# Patient Record
Sex: FEMALE | NOT HISPANIC OR LATINO | Employment: FULL TIME | URBAN - METROPOLITAN AREA
[De-identification: names, ages, dates, MRNs, and addresses within clinical notes are randomized per-mention and may not be internally consistent; named-entity substitution may affect disease eponyms.]

---

## 2023-10-23 PROBLEM — E80.6 INDIRECT HYPERBILIRUBINEMIA: Status: ACTIVE | Noted: 2023-10-23

## 2023-10-23 PROBLEM — D58.2 ELEVATED HEMOGLOBIN (CMS-HCC): Status: ACTIVE | Noted: 2023-10-23

## 2023-10-23 PROBLEM — R92.30 DENSE BREAST TISSUE ON MAMMOGRAM: Status: ACTIVE | Noted: 2023-10-23

## 2023-10-23 PROBLEM — I10 ESSENTIAL (PRIMARY) HYPERTENSION: Status: ACTIVE | Noted: 2023-10-23

## 2023-10-23 PROBLEM — R03.0 ELEVATED BLOOD PRESSURE READING: Status: ACTIVE | Noted: 2023-10-23

## 2023-10-23 PROBLEM — E80.4 GILBERT SYNDROME: Status: ACTIVE | Noted: 2023-10-23

## 2023-10-23 PROBLEM — F41.1 ANXIETY, GENERALIZED: Status: ACTIVE | Noted: 2023-10-23

## 2023-10-23 RX ORDER — SERTRALINE HYDROCHLORIDE 50 MG/1
50 TABLET, FILM COATED ORAL DAILY
COMMUNITY
Start: 2023-03-29 | End: 2023-10-24 | Stop reason: ALTCHOICE

## 2023-10-23 RX ORDER — PROPRANOLOL HYDROCHLORIDE 20 MG/1
TABLET ORAL EVERY 12 HOURS
COMMUNITY
Start: 2020-02-11 | End: 2023-10-24 | Stop reason: SDUPTHER

## 2023-10-23 RX ORDER — PNV NO.95/FERROUS FUM/FOLIC AC 28MG-0.8MG
TABLET ORAL
COMMUNITY

## 2023-10-23 RX ORDER — SPIRONOLACTONE 100 MG/1
100 TABLET, FILM COATED ORAL 2 TIMES DAILY
COMMUNITY

## 2023-10-23 RX ORDER — HYDROXYZINE HYDROCHLORIDE 25 MG/1
1-2 TABLET, FILM COATED ORAL EVERY 8 HOURS
COMMUNITY
Start: 2020-02-11

## 2023-10-24 ENCOUNTER — OFFICE VISIT (OUTPATIENT)
Dept: PRIMARY CARE | Facility: CLINIC | Age: 58
End: 2023-10-24
Payer: COMMERCIAL

## 2023-10-24 VITALS
SYSTOLIC BLOOD PRESSURE: 120 MMHG | HEART RATE: 66 BPM | DIASTOLIC BLOOD PRESSURE: 82 MMHG | BODY MASS INDEX: 24.42 KG/M2 | OXYGEN SATURATION: 99 % | RESPIRATION RATE: 18 BRPM | HEIGHT: 70 IN | TEMPERATURE: 98.4 F | WEIGHT: 170.6 LBS

## 2023-10-24 DIAGNOSIS — Z12.4 CERVICAL CANCER SCREENING: ICD-10-CM

## 2023-10-24 DIAGNOSIS — F41.1 ANXIETY, GENERALIZED: ICD-10-CM

## 2023-10-24 DIAGNOSIS — Z11.51 SCREENING FOR HPV (HUMAN PAPILLOMAVIRUS): ICD-10-CM

## 2023-10-24 DIAGNOSIS — Z00.00 ROUTINE HEALTH MAINTENANCE: Primary | ICD-10-CM

## 2023-10-24 DIAGNOSIS — I10 ESSENTIAL (PRIMARY) HYPERTENSION: ICD-10-CM

## 2023-10-24 DIAGNOSIS — Z12.31 BREAST CANCER SCREENING BY MAMMOGRAM: ICD-10-CM

## 2023-10-24 DIAGNOSIS — Z23 ENCOUNTER FOR IMMUNIZATION: ICD-10-CM

## 2023-10-24 PROCEDURE — 90686 IIV4 VACC NO PRSV 0.5 ML IM: CPT | Performed by: FAMILY MEDICINE

## 2023-10-24 PROCEDURE — 87624 HPV HI-RISK TYP POOLED RSLT: CPT | Performed by: FAMILY MEDICINE

## 2023-10-24 PROCEDURE — 99396 PREV VISIT EST AGE 40-64: CPT | Performed by: FAMILY MEDICINE

## 2023-10-24 PROCEDURE — 3079F DIAST BP 80-89 MM HG: CPT | Performed by: FAMILY MEDICINE

## 2023-10-24 PROCEDURE — 1036F TOBACCO NON-USER: CPT | Performed by: FAMILY MEDICINE

## 2023-10-24 PROCEDURE — 88175 CYTOPATH C/V AUTO FLUID REDO: CPT | Mod: TC,GCY | Performed by: FAMILY MEDICINE

## 2023-10-24 PROCEDURE — 3074F SYST BP LT 130 MM HG: CPT | Performed by: FAMILY MEDICINE

## 2023-10-24 RX ORDER — PROPRANOLOL HYDROCHLORIDE 20 MG/1
20 TABLET ORAL 2 TIMES DAILY
Qty: 60 TABLET | Refills: 1 | Status: SHIPPED | OUTPATIENT
Start: 2023-10-24 | End: 2024-02-05 | Stop reason: SDUPTHER

## 2023-10-24 ASSESSMENT — PROMIS GLOBAL HEALTH SCALE
RATE_SOCIAL_SATISFACTION: GOOD
EMOTIONAL_PROBLEMS: SOMETIMES
RATE_QUALITY_OF_LIFE: VERY GOOD
CARRYOUT_SOCIAL_ACTIVITIES: VERY GOOD
RATE_GENERAL_HEALTH: VERY GOOD
RATE_MENTAL_HEALTH: GOOD
RATE_PHYSICAL_HEALTH: VERY GOOD
RATE_AVERAGE_PAIN: 0
RATE_AVERAGE_FATIGUE: MILD
CARRYOUT_PHYSICAL_ACTIVITIES: COMPLETELY

## 2023-10-24 ASSESSMENT — PAIN SCALES - GENERAL: PAINLEVEL: 0-NO PAIN

## 2023-10-24 NOTE — PATIENT INSTRUCTIONS
Problem List Items Addressed This Visit             ICD-10-CM    Anxiety, generalized F41.1    Essential (primary) hypertension I10     Other Visit Diagnoses         Codes    Routine health maintenance    -  Primary Z00.00    Relevant Orders    BI mammo bilateral screening tomosynthesis    Comprehensive Metabolic Panel    Lipid Panel    CBC    Tsh With Reflex To Free T4 If Abnormal    Flu vaccine (IIV4) age 6 months and greater, preservative free    Breast cancer screening by mammogram     Z12.31    Relevant Orders    BI mammo bilateral screening tomosynthesis    Encounter for immunization     Z23    Relevant Orders    Flu vaccine (IIV4) age 6 months and greater, preservative free            Additional Visit Plans:  - Complete history and physical examination was performed      GENERAL RECOMMENDATIONS:  - Provided you with handouts on healthy eating, including the Top Ten Tips for a Healthy Diet  - I encourage you to eat a low-fat, moderate-carbohydrate, low-calorie diet to maintain a normal BMI (under 25) to reduce heart disease, and risk for diabetes  - Moderate intensity exercise for 30 minutes 5 days per week is recommended  - Along with recommendations for nutrition and exercise discussed today helpful resources recommended by the American Academy of Family Practice can be found at www.familydoctor.org or www.choosemyplate.gov  - Can also consider enrolling in a program such as Weight Watchers or Shelly Sj. The most effective diet is going to be one you can follow long term and turn into a lifestyle  - I recommend a routine vision check and dental cleanings every 6 months      BLOOD TESTING:  - We will obtain routine blood work, please have this done when you are fasting. The office will notify you of the test results once they are available and make any treatment recommendations accordingly  - Blood work may include a cholesterol and diabetes screen if risk factors exist (overweight, high blood pressure  etc); screening for sexually transmitted infections; and a one time Hepatitis C Virus screen for those born between 7822-8720.      VACCINATION RECOMMENDATIONS:  - Flu shot annually.  Received today  - Tetanus booster every 10 years.  Up-to-date, next due 2030  - Two pneumonia vaccinations starting at 65 years old (or earlier if risk factors - smoker, diabetic, heart or lung conditions) - not due yet.  - Shingles vaccine for those 50 years or older -due at this time, return for nurse visit when you are ready      SCREENING RECOMMENDATIONS:  - Cervical cancer screening (pap test) in women starting at age 21 until age 65 years old.  Due at this time, collected  - Mammogram screening for breast cancer in women starting at 40-50 years and every 1-2 years -  due at this time, order placed  - Bone density screening (DEXA) for osteoporosis in women aged 65 years and older (in younger women who are higher risk) - not due yet.  - Colon cancer screening (with colonoscopy or Cologuard) for men and women starting at age 45 until 74 years old (or earlier if family history of colon cancer) -up to date      Next Wellness Exam/Annual Physical Due  In 1 year from today    Patient Care Team:  Get Win DO as PCP - General (Family Medicine)    Get Win DO   10/24/23   3:07 PM

## 2023-10-24 NOTE — PROGRESS NOTES
Outpatient Visit Note    Chief Complaint   Patient presents with    Annual Exam     Does pt need pap, last one was 3 years ago       HPI:  Trish Jones is a 58 y.o. female here  for a complete physical.    She did not try the Zoloft - she felt she did not need it. Says her mood is ok now.     Health Maintenance.  Immunizations: Tdap due 2030.  Due for shingles vaccine.  Influenza vaccine received today.    Denies smoking or illicit drug use, drinks 6-7 alcoholic beverages a week. Patient does not get routine vision checks but does go for dental cleanings, and gets regular exercise with walking and yard work. Patient is not fasting for routine blood work today.    Screening colonoscopy was done 03/04/2020, with 5 year clearance based on FHx colonic polyps. Screening Pap was done in February 2020, negative with HPV negative. Next due 2023, which will be collected today. Screening mammogram last done 9/22/22.    Otherwise denies fevers, chills, cold/flu symptoms, SOB, CP, N/V, abdominal pain, dysuria, hematuria, melena, diarrhea or LE edema      History reviewed. No pertinent past medical history.     Current Medications  Current Outpatient Medications   Medication Instructions    cyanocobalamin (Vitamin B-12) 100 mcg tablet  as directed Orally    hydrOXYzine HCL (Atarax) 25 mg tablet 1-2 tablets, oral, Every 8 hours    propranolol (Inderal) 20 mg tablet Every 12 hours,  1 tablet on an empty stomach in the morning, and 1/2 tablet at night Orally Twice a day    spironolactone (ALDACTONE) 100 mg, oral, 2 times daily        Allergies  No Known Allergies     Immunizations  Immunization History   Administered Date(s) Administered    Flu vaccine (IIV4), preservative free *Check age/dose* 09/30/2020    Influenza, seasonal, injectable 10/05/2021    Moderna SARS-CoV-2 Vaccination 03/20/2021, 04/17/2021, 12/09/2021, 12/22/2021    Tdap vaccine, age 7 year and older (BOOSTRIX) 02/13/2020        History reviewed. No  pertinent surgical history.  Family History   Problem Relation Name Age of Onset    Cancer Mother      Breast cancer Mother      Diabetes Father      Colon cancer Maternal Grandmother      Cancer Maternal Grandmother      Parkinsonism Maternal Grandmother      Heart failure Maternal Grandmother       Social History     Tobacco Use    Smoking status: Never     Passive exposure: Never    Smokeless tobacco: Never   Substance Use Topics    Alcohol use: Yes     Alcohol/week: 7.0 standard drinks of alcohol     Types: 1 Glasses of wine, 6 Cans of beer per week    Drug use: Never     Tobacco Use: Low Risk  (10/24/2023)    Patient History     Smoking Tobacco Use: Never     Smokeless Tobacco Use: Never     Passive Exposure: Never        ROS  All pertinent positive symptoms are included in the history of present illness.  All other systems have been reviewed and are negative and noncontributory to this patient's current ailments.    VITAL SIGNS  Vitals:    10/24/23 1435   BP: 120/82   Pulse: 66   Resp: 18   Temp: 36.9 °C (98.4 °F)   SpO2: 99%     Vitals:    10/24/23 1435   Weight: 77.4 kg (170 lb 9.6 oz)      Body mass index is 24.48 kg/m².     PHYSICAL EXAM  GENERAL APPEARANCE: well nourished, well developed, looks like stated age, in no acute distress, not ill or tired appearing, conversing well.   HEENT: no trauma, normocephalic. PERRLA and EOMI with normal external exam. TM's intact with no injection or effusion, no signs of infection. Nares patent, turbinates pink without discharge. Pharynx pink with no exudates or lesions, no enlarged tonsils.   NECK: no nodes, supple without rigidity, no neck mass was observed, no thyromegaly or thyroid nodules.   HEART: regular rate and rhythm, S1 and S2 heard with no murmurs or skipped beats, no carotid bruits.   LUNGS: clear to auscultation bilaterally with no wheezes, crackles or rales.   ABDOMEN: no organomegaly, soft, nontender, nondistended, normal bowel sounds, no  guarding/rebound/rigidity.   EXTREMITIES: moving all extremities equally with no edema or deformities.   SKIN: normal skin color and pigmentation, normal skin turgor without rash, lesions, or nodules visualized.   NEUROLOGIC EXAM: CN II-XII grossly intact, normal gait, normal balance, 5/5 muscle strength, sensation grossly intact.   PSYCH: mood and affect appropriate; alert and oriented to time, place, person; no difficulty with speech or language.   LYMPH NODES: no cervical lymphadenopathy.         BREASTS: symmetrical, no masses / skin changes / dimpling / nipple discharge. No tenderness to palpation. No axillary lymphadenopathy.    FEMALE GENITOURINARY: external genitalia without erythema, exudate or discharge. Vaginal vault is without discharge. Cervix is of normal color without lesion. The os is closed. There is no bleeding noted. Uterus is noted to be of normal size and nontender. No cervical motion tenderness is seen. No masses are palpated. The adnexa are without masses or tenderness.   [place]: MA was present during pelvic exam.      Assessment/Plan   Problem List Items Addressed This Visit             ICD-10-CM    Anxiety, generalized F41.1    Essential (primary) hypertension I10     Other Visit Diagnoses         Codes    Routine health maintenance    -  Primary Z00.00    Relevant Orders    BI mammo bilateral screening tomosynthesis    Comprehensive Metabolic Panel    Lipid Panel    CBC    Tsh With Reflex To Free T4 If Abnormal    Flu vaccine (IIV4) age 6 months and greater, preservative free    Breast cancer screening by mammogram     Z12.31    Relevant Orders    BI mammo bilateral screening tomosynthesis    Encounter for immunization     Z23    Relevant Orders    Flu vaccine (IIV4) age 6 months and greater, preservative free            Additional Visit Plans:  - Complete history and physical examination was performed      GENERAL RECOMMENDATIONS:  - Provided you with handouts on healthy eating,  including the Top Ten Tips for a Healthy Diet  - I encourage you to eat a low-fat, moderate-carbohydrate, low-calorie diet to maintain a normal BMI (under 25) to reduce heart disease, and risk for diabetes  - Moderate intensity exercise for 30 minutes 5 days per week is recommended  - Along with recommendations for nutrition and exercise discussed today helpful resources recommended by the American Academy of Family Practice can be found at www.familydoctor.org or www.choosemyplate.gov  - Can also consider enrolling in a program such as Weight Watchers or Shelly Estradaig. The most effective diet is going to be one you can follow long term and turn into a lifestyle  - I recommend a routine vision check and dental cleanings every 6 months      BLOOD TESTING:  - We will obtain routine blood work, please have this done when you are fasting. The office will notify you of the test results once they are available and make any treatment recommendations accordingly  - Blood work may include a cholesterol and diabetes screen if risk factors exist (overweight, high blood pressure etc); screening for sexually transmitted infections; and a one time Hepatitis C Virus screen for those born between 2831-5588.      VACCINATION RECOMMENDATIONS:  - Flu shot annually.  Received today  - Tetanus booster every 10 years.  Up-to-date, next due 2030  - Two pneumonia vaccinations starting at 65 years old (or earlier if risk factors - smoker, diabetic, heart or lung conditions) - not due yet.  - Shingles vaccine for those 50 years or older -due at this time, return for nurse visit when you are ready      SCREENING RECOMMENDATIONS:  - Cervical cancer screening (pap test) in women starting at age 21 until age 65 years old.  Due at this time, collected  - Mammogram screening for breast cancer in women starting at 40-50 years and every 1-2 years -  due at this time, order placed  - Bone density screening (DEXA) for osteoporosis in women aged 65 years  and older (in younger women who are higher risk) - not due yet.  - Colon cancer screening (with colonoscopy or Cologuard) for men and women starting at age 45 until 74 years old (or earlier if family history of colon cancer) -up to date      Next Wellness Exam/Annual Physical Due  In 1 year from today    Patient Care Team:  Get Win DO as PCP - General (Family Medicine)    Get Win DO   10/24/23   3:07 PM

## 2023-11-03 LAB
CYTOLOGY CMNT CVX/VAG CYTO-IMP: NORMAL
HPV HR GENOTYPES PNL CVX NAA+PROBE: NEGATIVE
HPV HR GENOTYPES PNL CVX NAA+PROBE: NEGATIVE
HPV16 DNA SPEC QL NAA+PROBE: NEGATIVE
HPV18 DNA SPEC QL NAA+PROBE: NEGATIVE
LAB AP HPV GENOTYPE QUESTION: YES
LAB AP HPV HR: NORMAL
LABORATORY COMMENT REPORT: NORMAL
LMP START DATE: NORMAL
PATH REPORT.TOTAL CANCER: NORMAL

## 2023-11-08 ENCOUNTER — LAB (OUTPATIENT)
Dept: LAB | Facility: LAB | Age: 58
End: 2023-11-08
Payer: COMMERCIAL

## 2023-11-08 DIAGNOSIS — Z00.00 ENCOUNTER FOR GENERAL ADULT MEDICAL EXAMINATION WITHOUT ABNORMAL FINDINGS: Primary | ICD-10-CM

## 2023-11-08 LAB
ALBUMIN SERPL BCP-MCNC: 4.4 G/DL (ref 3.4–5)
ALP SERPL-CCNC: 56 U/L (ref 33–110)
ALT SERPL W P-5'-P-CCNC: 14 U/L (ref 7–45)
ANION GAP SERPL CALC-SCNC: 16 MMOL/L (ref 10–20)
AST SERPL W P-5'-P-CCNC: 16 U/L (ref 9–39)
BASOPHILS # BLD AUTO: 0.05 X10*3/UL (ref 0–0.1)
BASOPHILS NFR BLD AUTO: 0.7 %
BILIRUB SERPL-MCNC: 2.2 MG/DL (ref 0–1.2)
BUN SERPL-MCNC: 14 MG/DL (ref 6–23)
CALCIUM SERPL-MCNC: 9.9 MG/DL (ref 8.6–10.3)
CHLORIDE SERPL-SCNC: 99 MMOL/L (ref 98–107)
CHOLEST SERPL-MCNC: 211 MG/DL (ref 0–199)
CHOLESTEROL/HDL RATIO: 3.6
CO2 SERPL-SCNC: 28 MMOL/L (ref 21–32)
CREAT SERPL-MCNC: 0.91 MG/DL (ref 0.5–1.05)
EOSINOPHIL # BLD AUTO: 0.08 X10*3/UL (ref 0–0.7)
EOSINOPHIL NFR BLD AUTO: 1.2 %
ERYTHROCYTE [DISTWIDTH] IN BLOOD BY AUTOMATED COUNT: 11.7 % (ref 11.5–14.5)
GFR SERPL CREATININE-BSD FRML MDRD: 74 ML/MIN/1.73M*2
GLUCOSE SERPL-MCNC: 92 MG/DL (ref 74–99)
HCT VFR BLD AUTO: 45 % (ref 36–46)
HDLC SERPL-MCNC: 58.5 MG/DL
HGB BLD-MCNC: 14.7 G/DL (ref 12–16)
IMM GRANULOCYTES # BLD AUTO: 0.01 X10*3/UL (ref 0–0.7)
IMM GRANULOCYTES NFR BLD AUTO: 0.1 % (ref 0–0.9)
LDLC SERPL CALC-MCNC: 125 MG/DL
LYMPHOCYTES # BLD AUTO: 2.65 X10*3/UL (ref 1.2–4.8)
LYMPHOCYTES NFR BLD AUTO: 39.6 %
MCH RBC QN AUTO: 31.1 PG (ref 26–34)
MCHC RBC AUTO-ENTMCNC: 32.7 G/DL (ref 32–36)
MCV RBC AUTO: 95 FL (ref 80–100)
MONOCYTES # BLD AUTO: 0.73 X10*3/UL (ref 0.1–1)
MONOCYTES NFR BLD AUTO: 10.9 %
NEUTROPHILS # BLD AUTO: 3.17 X10*3/UL (ref 1.2–7.7)
NEUTROPHILS NFR BLD AUTO: 47.5 %
NON HDL CHOLESTEROL: 153 MG/DL (ref 0–149)
NRBC BLD-RTO: 0 /100 WBCS (ref 0–0)
PLATELET # BLD AUTO: 291 X10*3/UL (ref 150–450)
POTASSIUM SERPL-SCNC: 4.3 MMOL/L (ref 3.5–5.3)
PROT SERPL-MCNC: 7.6 G/DL (ref 6.4–8.2)
RBC # BLD AUTO: 4.73 X10*6/UL (ref 4–5.2)
SODIUM SERPL-SCNC: 139 MMOL/L (ref 136–145)
TRIGL SERPL-MCNC: 138 MG/DL (ref 0–149)
TSH SERPL-ACNC: 2.18 MIU/L (ref 0.44–3.98)
VLDL: 28 MG/DL (ref 0–40)
WBC # BLD AUTO: 6.7 X10*3/UL (ref 4.4–11.3)

## 2023-11-08 PROCEDURE — 36415 COLL VENOUS BLD VENIPUNCTURE: CPT

## 2023-11-09 ENCOUNTER — ANCILLARY PROCEDURE (OUTPATIENT)
Dept: RADIOLOGY | Facility: CLINIC | Age: 58
End: 2023-11-09
Payer: COMMERCIAL

## 2023-11-09 VITALS — WEIGHT: 170 LBS | HEIGHT: 69 IN | BODY MASS INDEX: 25.18 KG/M2

## 2023-11-09 DIAGNOSIS — Z00.00 ROUTINE HEALTH MAINTENANCE: ICD-10-CM

## 2023-11-09 DIAGNOSIS — Z12.31 BREAST CANCER SCREENING BY MAMMOGRAM: ICD-10-CM

## 2023-11-09 PROCEDURE — 77067 SCR MAMMO BI INCL CAD: CPT

## 2024-02-02 DIAGNOSIS — I10 ESSENTIAL (PRIMARY) HYPERTENSION: ICD-10-CM

## 2024-02-08 ENCOUNTER — PATIENT MESSAGE (OUTPATIENT)
Dept: PRIMARY CARE | Facility: CLINIC | Age: 59
End: 2024-02-08
Payer: COMMERCIAL

## 2024-02-08 DIAGNOSIS — I10 ESSENTIAL (PRIMARY) HYPERTENSION: ICD-10-CM

## 2024-02-08 NOTE — TELEPHONE ENCOUNTER
From: Trish Jones  To: Get Win DO  Sent: 2/8/2024 7:07 AM EST  Subject: Need refill on my Propranolol    Morning! I called about needing a refill last Friday and again earlier this week. I still haven't heard back about it and I have only appx 1 week left of the medication. I was there for my annual on October 24th, so I should still have a month or 2 left on my prescription. (if my blood pressure is supposed to be checked every 6 months) Can someone please send my refills to [facility] soon? Or contact me so that I know I have to make an appt very quickly? Thank you! Have a great day.

## 2024-02-09 RX ORDER — PROPRANOLOL HYDROCHLORIDE 20 MG/1
TABLET ORAL
Qty: 135 TABLET | Refills: 0 | Status: SHIPPED | OUTPATIENT
Start: 2024-02-09 | End: 2024-05-20 | Stop reason: SDUPTHER

## 2024-02-09 RX ORDER — PROPRANOLOL HYDROCHLORIDE 20 MG/1
20 TABLET ORAL 2 TIMES DAILY
Qty: 60 TABLET | Refills: 1 | OUTPATIENT
Start: 2024-02-09 | End: 2024-08-07

## 2024-02-09 NOTE — TELEPHONE ENCOUNTER
Patient is overdue for medication follow-up visit, please schedule this at her earliest convenience.  Will send a refill on her propranolol at this time.  Thank you

## 2024-05-20 ENCOUNTER — TELEPHONE (OUTPATIENT)
Dept: PRIMARY CARE | Facility: CLINIC | Age: 59
End: 2024-05-20

## 2024-05-20 DIAGNOSIS — I10 ESSENTIAL (PRIMARY) HYPERTENSION: ICD-10-CM

## 2024-05-30 RX ORDER — PROPRANOLOL HYDROCHLORIDE 20 MG/1
TABLET ORAL
Qty: 135 TABLET | Refills: 0 | Status: SHIPPED | OUTPATIENT
Start: 2024-05-30

## 2024-06-20 ENCOUNTER — OFFICE VISIT (OUTPATIENT)
Dept: PRIMARY CARE | Facility: CLINIC | Age: 59
End: 2024-06-20
Payer: COMMERCIAL

## 2024-06-20 VITALS
SYSTOLIC BLOOD PRESSURE: 124 MMHG | TEMPERATURE: 98.2 F | HEIGHT: 69 IN | OXYGEN SATURATION: 95 % | BODY MASS INDEX: 25.98 KG/M2 | WEIGHT: 175.4 LBS | HEART RATE: 51 BPM | DIASTOLIC BLOOD PRESSURE: 84 MMHG

## 2024-06-20 DIAGNOSIS — F41.1 ANXIETY, GENERALIZED: Primary | ICD-10-CM

## 2024-06-20 DIAGNOSIS — I10 ESSENTIAL (PRIMARY) HYPERTENSION: ICD-10-CM

## 2024-06-20 DIAGNOSIS — Z11.59 NEED FOR HEPATITIS C SCREENING TEST: ICD-10-CM

## 2024-06-20 DIAGNOSIS — Z00.00 ROUTINE HEALTH MAINTENANCE: ICD-10-CM

## 2024-06-20 DIAGNOSIS — E80.4 GILBERT SYNDROME: ICD-10-CM

## 2024-06-20 PROCEDURE — 3079F DIAST BP 80-89 MM HG: CPT | Performed by: FAMILY MEDICINE

## 2024-06-20 PROCEDURE — 3074F SYST BP LT 130 MM HG: CPT | Performed by: FAMILY MEDICINE

## 2024-06-20 PROCEDURE — 99214 OFFICE O/P EST MOD 30 MIN: CPT | Performed by: FAMILY MEDICINE

## 2024-06-20 RX ORDER — PROPRANOLOL HYDROCHLORIDE 20 MG/1
TABLET ORAL
Qty: 135 TABLET | Refills: 1 | Status: SHIPPED | OUTPATIENT
Start: 2024-06-20

## 2024-06-20 RX ORDER — HYDROXYZINE HYDROCHLORIDE 25 MG/1
25-50 TABLET, FILM COATED ORAL EVERY 8 HOURS
Qty: 90 TABLET | Refills: 0 | Status: SHIPPED | OUTPATIENT
Start: 2024-06-20

## 2024-06-20 ASSESSMENT — ENCOUNTER SYMPTOMS
OCCASIONAL FEELINGS OF UNSTEADINESS: 0
LOSS OF SENSATION IN FEET: 0
DEPRESSION: 0

## 2024-06-20 ASSESSMENT — PAIN SCALES - GENERAL: PAINLEVEL: 0-NO PAIN

## 2024-06-20 ASSESSMENT — ANXIETY QUESTIONNAIRES
6. BECOMING EASILY ANNOYED OR IRRITABLE: NOT AT ALL
GAD7 TOTAL SCORE: 1
4. TROUBLE RELAXING: NOT AT ALL
2. NOT BEING ABLE TO STOP OR CONTROL WORRYING: SEVERAL DAYS
7. FEELING AFRAID AS IF SOMETHING AWFUL MIGHT HAPPEN: NOT AT ALL
5. BEING SO RESTLESS THAT IT IS HARD TO SIT STILL: NOT AT ALL
3. WORRYING TOO MUCH ABOUT DIFFERENT THINGS: NOT AT ALL
IF YOU CHECKED OFF ANY PROBLEMS ON THIS QUESTIONNAIRE, HOW DIFFICULT HAVE THESE PROBLEMS MADE IT FOR YOU TO DO YOUR WORK, TAKE CARE OF THINGS AT HOME, OR GET ALONG WITH OTHER PEOPLE: NOT DIFFICULT AT ALL
1. FEELING NERVOUS, ANXIOUS, OR ON EDGE: NOT AT ALL

## 2024-06-20 ASSESSMENT — COLUMBIA-SUICIDE SEVERITY RATING SCALE - C-SSRS
2. HAVE YOU ACTUALLY HAD ANY THOUGHTS OF KILLING YOURSELF?: NO
6. HAVE YOU EVER DONE ANYTHING, STARTED TO DO ANYTHING, OR PREPARED TO DO ANYTHING TO END YOUR LIFE?: NO
1. IN THE PAST MONTH, HAVE YOU WISHED YOU WERE DEAD OR WISHED YOU COULD GO TO SLEEP AND NOT WAKE UP?: NO

## 2024-06-20 ASSESSMENT — PATIENT HEALTH QUESTIONNAIRE - PHQ9
SUM OF ALL RESPONSES TO PHQ9 QUESTIONS 1 AND 2: 0
1. LITTLE INTEREST OR PLEASURE IN DOING THINGS: NOT AT ALL
2. FEELING DOWN, DEPRESSED OR HOPELESS: NOT AT ALL

## 2024-06-20 NOTE — PATIENT INSTRUCTIONS
Problem List Items Addressed This Visit             ICD-10-CM    Anxiety, generalized - Primary F41.1    Relevant Medications    hydrOXYzine HCL (Atarax) 25 mg tablet    Other Relevant Orders    TSH with reflex to Free T4 if abnormal    Essential (primary) hypertension I10    Relevant Medications    propranolol (Inderal) 20 mg tablet    Other Relevant Orders    Comprehensive Metabolic Panel    Lipid Panel    CBC    TSH with reflex to Free T4 if abnormal    Gilbert syndrome E80.4    Relevant Orders    Comprehensive Metabolic Panel     Other Visit Diagnoses         Codes    Routine health maintenance     Z00.00    Relevant Orders    Hepatitis C Antibody    Comprehensive Metabolic Panel    Lipid Panel    CBC    TSH with reflex to Free T4 if abnormal    Need for hepatitis C screening test     Z11.59    Relevant Orders    Hepatitis C Antibody            Additional Visit Plans:  Please follow-up every 6 months for medication checkup, or sooner if an adjustment is needed.    Plan to have you measure blood pressure at home after 30 minutes of rest 4 days a week. Call in 3 weeks with your numbers. If more coverage is needed, maybe we can add some element of spironolactone back.     Blood pressure improved with recheck.     Next Wellness Exam/Annual Physical Due  Please return at your earliest convenience for complete physical.  The complete physical allows us to review appropriate cancer screenings, routine blood work and immunizations.  Will order routine annual blood work in preparation for your physical     Patient Care Team:  Get Win DO as PCP - General (Family Medicine)    Get Win DO   06/20/24   9:15 AM

## 2024-06-20 NOTE — PROGRESS NOTES
Outpatient Visit Note    Chief Complaint   Patient presents with    Hypertension       HPI:  Trish Jones is a 59 y.o. female here for follow-up on her medications.    Last seen by me in 2022.    She has insomnia and racing thoughts for which she uses hydroxyzine as needed.  Continues to not want a daily medication for her mood. Has not needed the hydroxyzine as much anymore.  Tolerates this well without concerning side effect, remains effective when used.     For hypertension she does not measure blood pressure readings at home but denies any headache, blurry vision, nosebleeds, chest pain, shortness of breath, dizziness or lower extremity edema.  She is on propranolol for this.  Previously on spironolactone to help with this and some hirsutism but has been without this medication due to loss of follow-up (100 mg twice a day). Not much hair growth since.  Not interested in adding that per se right now.     Has a history of Gilbert syndrome.      PHQ9/GAD7:  Over the last 2 weeks, how often have you been bothered by any of the following problems?  Feeling nervous, anxious, or on edge: Not at all  Not being able to stop or control worrying: Several days  Worrying too much about different things: Not at all  Trouble relaxing: Not at all  Being so restless that it is hard to sit still: Not at all  Becoming easily annoyed or irritable: Not at all  Feeling afraid as if something awful might happen: Not at all  WALESKA-7 Total Score: 1      Patient Active Problem List    Diagnosis Date Noted    Anxiety, generalized 10/23/2023    Dense breast tissue on mammogram 10/23/2023    Elevated blood pressure reading 10/23/2023    Elevated hemoglobin (CMS-HCC) 10/23/2023    Essential (primary) hypertension 10/23/2023    Gilbert syndrome 10/23/2023    Indirect hyperbilirubinemia 10/23/2023        Past Medical History:   Diagnosis Date    Anxiety 2019 mild    Hypertension     Varicella Age6-8        Current Medications  Current  Outpatient Medications   Medication Instructions    cyanocobalamin (Vitamin B-12) 100 mcg tablet  as directed Orally    hydrOXYzine HCL (ATARAX) 25-50 mg, oral, Every 8 hours    propranolol (Inderal) 20 mg tablet 1 tablet on an empty stomach in the morning, and 1/2 tablet at night orally        Allergies  No Known Allergies     Past Surgical History:   Procedure Laterality Date    BREAST CYST ASPIRATION  2007     Family History   Problem Relation Name Age of Onset    Cancer Mother Jelly     Breast cancer Mother Jelly     Arthritis Mother Jelly     Diabetes Father Luis Miguel     Colon cancer Maternal Grandmother Melinda     Cancer Maternal Grandmother Melinda     Parkinsonism Maternal Grandmother Melinda     Heart failure Maternal Grandmother Melinda      Social History     Tobacco Use    Smoking status: Never     Passive exposure: Never    Smokeless tobacco: Never   Vaping Use    Vaping status: Never Used   Substance Use Topics    Alcohol use: Yes     Alcohol/week: 6.0 standard drinks of alcohol     Types: 6 Standard drinks or equivalent per week    Drug use: Never     Tobacco Use: Low Risk  (6/20/2024)    Patient History     Smoking Tobacco Use: Never     Smokeless Tobacco Use: Never     Passive Exposure: Never        ROS  All pertinent positive symptoms are included in the history of present illness.  All other systems have been reviewed and are negative and noncontributory to this patient's current ailments.    VITAL SIGNS  Vitals:    06/20/24 0849   BP: 124/84   Pulse: 51   Temp: 36.8 °C (98.2 °F)   SpO2: 95%     Vitals:    06/20/24 0849   Weight: 79.6 kg (175 lb 6.4 oz)      Body mass index is 25.9 kg/m².     PHYSICAL EXAM  GENERAL APPEARANCE: well nourished, well developed, looks like stated age, in no acute distress, not ill or tired appearing, conversing well.   HEENT: no trauma, normocephalic.   NECK: no nodes, supple without rigidity, no neck mass was observed,  no thyromegaly.   HEART: regular rate and rhythm, S1  and S2 heard with no murmurs or skipped beats. No carotid bruits.  LUNGS: clear to auscultation bilaterally with no wheezes, crackles or rales.   EXTREMITIES: moving all extremities equally with no edema or deformities.   SKIN: normal skin color and pigmentation, normal skin turgor without rash, lesions, or nodules visualized.   NEUROLOGIC EXAM: CN II-XII grossly intact, normal gait, normal balance.   PSYCH: mood and affect appropriate; alert and oriented to time, place, person; no difficulty with speech or language.     Counseling:       Medication education:           Education:  The patient is counseled regarding potential side-effects of all new medications          Understanding:  Patient expressed understanding of information conveyed today          Adherence:  No barriers to adherence identified     Assessment/Plan   Problem List Items Addressed This Visit             ICD-10-CM    Anxiety, generalized - Primary F41.1    Relevant Medications    hydrOXYzine HCL (Atarax) 25 mg tablet    Other Relevant Orders    TSH with reflex to Free T4 if abnormal    Essential (primary) hypertension I10    Relevant Medications    propranolol (Inderal) 20 mg tablet    Other Relevant Orders    Comprehensive Metabolic Panel    Lipid Panel    CBC    TSH with reflex to Free T4 if abnormal    Gilbert syndrome E80.4    Relevant Orders    Comprehensive Metabolic Panel     Other Visit Diagnoses         Codes    Routine health maintenance     Z00.00    Relevant Orders    Hepatitis C Antibody    Comprehensive Metabolic Panel    Lipid Panel    CBC    TSH with reflex to Free T4 if abnormal    Need for hepatitis C screening test     Z11.59    Relevant Orders    Hepatitis C Antibody            Additional Visit Plans:  Please follow-up every 6 months for medication checkup, or sooner if an adjustment is needed.    Plan to have you measure blood pressure at home after 30 minutes of rest 4 days a week. Call in 3 weeks with your numbers. If more  coverage is needed, maybe we can add some element of spironolactone back.     Blood pressure improved with recheck. Medicine refilled at current dosing.     Next Wellness Exam/Annual Physical Due  Please return at your earliest convenience for complete physical.  The complete physical allows us to review appropriate cancer screenings, routine blood work and immunizations.  Will order routine annual blood work in preparation for your physical     Patient Care Team:  Get Win DO as PCP - General (Family Medicine)    Get Win DO   06/20/24   9:15 AM

## 2024-10-16 ENCOUNTER — LAB (OUTPATIENT)
Dept: LAB | Facility: LAB | Age: 59
End: 2024-10-16
Payer: COMMERCIAL

## 2024-10-16 DIAGNOSIS — E80.4 GILBERT SYNDROME: ICD-10-CM

## 2024-10-16 DIAGNOSIS — Z11.59 NEED FOR HEPATITIS C SCREENING TEST: ICD-10-CM

## 2024-10-16 DIAGNOSIS — Z00.00 ROUTINE HEALTH MAINTENANCE: ICD-10-CM

## 2024-10-16 DIAGNOSIS — I10 ESSENTIAL (PRIMARY) HYPERTENSION: ICD-10-CM

## 2024-10-16 DIAGNOSIS — F41.1 ANXIETY, GENERALIZED: ICD-10-CM

## 2024-10-16 LAB
ALBUMIN SERPL BCP-MCNC: 4.2 G/DL (ref 3.4–5)
ALP SERPL-CCNC: 55 U/L (ref 33–110)
ALT SERPL W P-5'-P-CCNC: 12 U/L (ref 7–45)
ANION GAP SERPL CALC-SCNC: 9 MMOL/L (ref 10–20)
AST SERPL W P-5'-P-CCNC: 13 U/L (ref 9–39)
BILIRUB SERPL-MCNC: 2.4 MG/DL (ref 0–1.2)
BUN SERPL-MCNC: 16 MG/DL (ref 6–23)
CALCIUM SERPL-MCNC: 9.4 MG/DL (ref 8.6–10.3)
CHLORIDE SERPL-SCNC: 102 MMOL/L (ref 98–107)
CHOLEST SERPL-MCNC: 194 MG/DL (ref 0–199)
CHOLESTEROL/HDL RATIO: 3.6
CO2 SERPL-SCNC: 30 MMOL/L (ref 21–32)
CREAT SERPL-MCNC: 0.89 MG/DL (ref 0.5–1.05)
EGFRCR SERPLBLD CKD-EPI 2021: 75 ML/MIN/1.73M*2
ERYTHROCYTE [DISTWIDTH] IN BLOOD BY AUTOMATED COUNT: 11.8 % (ref 11.5–14.5)
GLUCOSE SERPL-MCNC: 90 MG/DL (ref 74–99)
HCT VFR BLD AUTO: 43.8 % (ref 36–46)
HDLC SERPL-MCNC: 53.3 MG/DL
HGB BLD-MCNC: 14.4 G/DL (ref 12–16)
LDLC SERPL CALC-MCNC: 111 MG/DL
MCH RBC QN AUTO: 30.7 PG (ref 26–34)
MCHC RBC AUTO-ENTMCNC: 32.9 G/DL (ref 32–36)
MCV RBC AUTO: 93 FL (ref 80–100)
NON HDL CHOLESTEROL: 141 MG/DL (ref 0–149)
NRBC BLD-RTO: 0 /100 WBCS (ref 0–0)
PLATELET # BLD AUTO: 235 X10*3/UL (ref 150–450)
POTASSIUM SERPL-SCNC: 4.2 MMOL/L (ref 3.5–5.3)
PROT SERPL-MCNC: 6.9 G/DL (ref 6.4–8.2)
RBC # BLD AUTO: 4.69 X10*6/UL (ref 4–5.2)
SODIUM SERPL-SCNC: 137 MMOL/L (ref 136–145)
TRIGL SERPL-MCNC: 148 MG/DL (ref 0–149)
TSH SERPL-ACNC: 1.97 MIU/L (ref 0.44–3.98)
VLDL: 30 MG/DL (ref 0–40)
WBC # BLD AUTO: 6.3 X10*3/UL (ref 4.4–11.3)

## 2024-10-16 PROCEDURE — 36415 COLL VENOUS BLD VENIPUNCTURE: CPT

## 2024-10-16 PROCEDURE — 86803 HEPATITIS C AB TEST: CPT

## 2024-10-17 LAB — HCV AB SER QL: NONREACTIVE

## 2024-10-25 ENCOUNTER — OFFICE VISIT (OUTPATIENT)
Dept: PRIMARY CARE | Facility: CLINIC | Age: 59
End: 2024-10-25
Payer: COMMERCIAL

## 2024-10-25 VITALS
WEIGHT: 172.3 LBS | TEMPERATURE: 97 F | DIASTOLIC BLOOD PRESSURE: 80 MMHG | BODY MASS INDEX: 25.44 KG/M2 | SYSTOLIC BLOOD PRESSURE: 132 MMHG | HEART RATE: 59 BPM | OXYGEN SATURATION: 100 % | RESPIRATION RATE: 14 BRPM

## 2024-10-25 DIAGNOSIS — Z12.31 BREAST CANCER SCREENING BY MAMMOGRAM: ICD-10-CM

## 2024-10-25 DIAGNOSIS — Z91.89 AT HIGH RISK FOR BREAST CANCER: ICD-10-CM

## 2024-10-25 DIAGNOSIS — Z80.3 FAMILY HISTORY OF BREAST CANCER: ICD-10-CM

## 2024-10-25 DIAGNOSIS — Z23 ENCOUNTER FOR IMMUNIZATION: ICD-10-CM

## 2024-10-25 DIAGNOSIS — Z12.39 BREAST CANCER SCREENING OTHER THAN MAMMOGRAM: ICD-10-CM

## 2024-10-25 DIAGNOSIS — Z80.3 FAMILY HISTORY OF BREAST CANCER IN FIRST DEGREE RELATIVE: ICD-10-CM

## 2024-10-25 DIAGNOSIS — Z00.00 ROUTINE HEALTH MAINTENANCE: Primary | ICD-10-CM

## 2024-10-25 PROCEDURE — 90471 IMMUNIZATION ADMIN: CPT | Performed by: FAMILY MEDICINE

## 2024-10-25 PROCEDURE — 3079F DIAST BP 80-89 MM HG: CPT | Performed by: FAMILY MEDICINE

## 2024-10-25 PROCEDURE — 90656 IIV3 VACC NO PRSV 0.5 ML IM: CPT | Performed by: FAMILY MEDICINE

## 2024-10-25 PROCEDURE — 99396 PREV VISIT EST AGE 40-64: CPT | Performed by: FAMILY MEDICINE

## 2024-10-25 PROCEDURE — 90750 HZV VACC RECOMBINANT IM: CPT | Performed by: FAMILY MEDICINE

## 2024-10-25 PROCEDURE — 3075F SYST BP GE 130 - 139MM HG: CPT | Performed by: FAMILY MEDICINE

## 2024-10-25 PROCEDURE — 99213 OFFICE O/P EST LOW 20 MIN: CPT | Performed by: FAMILY MEDICINE

## 2024-10-25 PROCEDURE — 90472 IMMUNIZATION ADMIN EACH ADD: CPT | Performed by: FAMILY MEDICINE

## 2024-10-25 ASSESSMENT — PROMIS GLOBAL HEALTH SCALE
CARRYOUT_PHYSICAL_ACTIVITIES: COMPLETELY
RATE_PHYSICAL_HEALTH: GOOD
RATE_AVERAGE_FATIGUE: MILD
RATE_MENTAL_HEALTH: GOOD
RATE_SOCIAL_SATISFACTION: GOOD
RATE_QUALITY_OF_LIFE: GOOD
RATE_AVERAGE_PAIN: 0
RATE_GENERAL_HEALTH: GOOD
EMOTIONAL_PROBLEMS: SOMETIMES
CARRYOUT_SOCIAL_ACTIVITIES: GOOD

## 2024-10-25 ASSESSMENT — PATIENT HEALTH QUESTIONNAIRE - PHQ9
2. FEELING DOWN, DEPRESSED OR HOPELESS: NOT AT ALL
SUM OF ALL RESPONSES TO PHQ9 QUESTIONS 1 & 2: 0
2. FEELING DOWN, DEPRESSED OR HOPELESS: NOT AT ALL
SUM OF ALL RESPONSES TO PHQ9 QUESTIONS 1 AND 2: 0
1. LITTLE INTEREST OR PLEASURE IN DOING THINGS: NOT AT ALL
1. LITTLE INTEREST OR PLEASURE IN DOING THINGS: NOT AT ALL

## 2024-10-25 ASSESSMENT — ENCOUNTER SYMPTOMS
DEPRESSION: 0
LOSS OF SENSATION IN FEET: 0
OCCASIONAL FEELINGS OF UNSTEADINESS: 0

## 2024-10-25 ASSESSMENT — COLUMBIA-SUICIDE SEVERITY RATING SCALE - C-SSRS
6. HAVE YOU EVER DONE ANYTHING, STARTED TO DO ANYTHING, OR PREPARED TO DO ANYTHING TO END YOUR LIFE?: NO
2. HAVE YOU ACTUALLY HAD ANY THOUGHTS OF KILLING YOURSELF?: NO
1. IN THE PAST MONTH, HAVE YOU WISHED YOU WERE DEAD OR WISHED YOU COULD GO TO SLEEP AND NOT WAKE UP?: NO

## 2024-10-25 ASSESSMENT — PAIN SCALES - GENERAL: PAINLEVEL_OUTOF10: 0-NO PAIN

## 2024-10-25 NOTE — PROGRESS NOTES
Outpatient Visit Note    Chief Complaint   Patient presents with    Annual Exam       HPI:  Trish Jones is a 59 y.o. female here  for a complete physical.    Health Maintenance.  Immunizations: Tdap is due 2030.  Influenza and shingles vaccination is due.    Denies smoking or illicit drug use, drinks 6 alcoholic beverages a week. Patient reports routine vision checks and dental cleanings, and regular exercise with walking.  She recently had blood work done in preparation for today's visit.    Kidneys and liver look good.  Bilirubin is at baseline, has Gilbert's syndrome.  Slight elevation in LDL cholesterol but improved from previous hep C screen is negative.  Thyroid level is normal.  CBC looks good with no anemia.  Focus on low-fat diet and exercise.    Screening colonoscopy done in 2020 with 5 year clearance. Next due 2025. Screening pap done 2023, screening mammogram is due. Sister diagnosed with breast cancer. Patient has been high risk and we talked before about screening MRI.     Otherwise denies fevers, chills, cold/flu symptoms, SOB, CP, N/V, abdominal pain, dysuria, hematuria, melena, diarrhea or LE edema    PHQ9/GAD7:         Patient Active Problem List    Diagnosis Date Noted    Family history of breast cancer in first degree relative 10/25/2024    At high risk for breast cancer 10/25/2024    Anxiety, generalized 10/23/2023    Dense breast tissue on mammogram 10/23/2023    Elevated blood pressure reading 10/23/2023    Elevated hemoglobin (CMS-HCC) 10/23/2023    Essential (primary) hypertension 10/23/2023    Gilbert syndrome 10/23/2023    Indirect hyperbilirubinemia 10/23/2023        Past Medical History:   Diagnosis Date    Anxiety 2019 mild    Hypertension     Varicella Age6-8        Current Medications  Current Outpatient Medications   Medication Instructions    cyanocobalamin (Vitamin B-12) 100 mcg tablet as directed Orally    hydrOXYzine HCL (ATARAX) 25-50 mg, oral, Every 8 hours     propranolol (Inderal) 20 mg tablet 1 tablet on an empty stomach in the morning, and 1/2 tablet at night orally        Allergies  No Known Allergies     Immunizations  Immunization History   Administered Date(s) Administered    Flu vaccine (IIV4), preservative free *Check age/dose* 09/30/2020, 10/24/2023    Influenza, injectable, quadrivalent 09/30/2020    Influenza, seasonal, injectable 10/05/2021    Moderna SARS-CoV-2 Vaccination 03/20/2021, 04/17/2021, 12/09/2021, 12/22/2021    Tdap vaccine, age 7 year and older (BOOSTRIX, ADACEL) 02/13/2020        Past Surgical History:   Procedure Laterality Date    BREAST CYST ASPIRATION  2007     Family History   Problem Relation Name Age of Onset    Cancer Mother Jelly     Breast cancer Mother Jelly     Arthritis Mother Jelly     Diabetes Father Luis Miguel     Colon cancer Maternal Grandmother Melinda     Cancer Maternal Grandmother Melinda     Parkinsonism Maternal Grandmother Melinda     Heart failure Maternal Grandmother Melinda      Social History     Tobacco Use    Smoking status: Never     Passive exposure: Never    Smokeless tobacco: Never   Vaping Use    Vaping status: Never Used   Substance Use Topics    Alcohol use: Yes     Alcohol/week: 6.0 standard drinks of alcohol     Types: 6 Standard drinks or equivalent per week    Drug use: Never     Tobacco Use: Low Risk  (10/25/2024)    Patient History     Smoking Tobacco Use: Never     Smokeless Tobacco Use: Never     Passive Exposure: Never        ROS  All pertinent positive symptoms are included in the history of present illness.  All other systems have been reviewed and are negative and noncontributory to this patient's current ailments.    VITAL SIGNS  Vitals:    10/25/24 0827   BP: 132/80   Pulse: 59   Resp: 14   Temp: 36.1 °C (97 °F)   SpO2: 100%     Vitals:    10/25/24 0827   Weight: 78.2 kg (172 lb 4.8 oz)      Body mass index is 25.44 kg/m².     PHYSICAL EXAM  GENERAL APPEARANCE: well nourished, well developed, looks like  stated age, in no acute distress, not ill or tired appearing, conversing well.   HEENT: no trauma, normocephalic. PERRLA and EOMI with normal external exam. TM's intact with no injection or effusion, no signs of infection.   NECK: no nodes, supple without rigidity, no neck mass was observed, no thyromegaly or thyroid nodules.   HEART: regular rate and rhythm, S1 and S2 heard with no murmurs or skipped beats, no carotid bruits.   LUNGS: clear to auscultation bilaterally with no wheezes, crackles or rales.   ABDOMEN: no organomegaly, soft, nontender, nondistended, normal bowel sounds, no guarding/rebound/rigidity.   EXTREMITIES: moving all extremities equally with no edema or deformities.   SKIN: normal skin color and pigmentation, normal skin turgor without rash, lesions, or nodules visualized.   NEUROLOGIC EXAM: CN II-XII grossly intact, normal gait, normal balance, 5/5 muscle strength  PSYCH: mood and affect appropriate; alert and oriented to time, place, person; no difficulty with speech or language.   LYMPH NODES: no cervical lymphadenopathy.         Counseling:       Medication education:           Education:  The patient is counseled regarding potential side-effects of all new medications          Understanding:  Patient expressed understanding of information conveyed today          Adherence:  No barriers to adherence identified      Assessment/Plan   Problem List Items Addressed This Visit             ICD-10-CM    Family history of breast cancer in first degree relative Z80.3    Relevant Orders    BI mammo bilateral screening tomosynthesis    [facility]    Blood Urea Nitrogen    Creatinine    MR breast bilateral w contrast full protocol    At high risk for breast cancer Z91.89    Relevant Orders    BI mammo bilateral screening tomosynthesis    [facility]    Blood Urea Nitrogen    Creatinine    MR breast bilateral w contrast full protocol     Other Visit Diagnoses         Codes    Routine health maintenance     -  Primary Z00.00    Relevant Orders    Flu vaccine, trivalent, preservative free, age 6 months and greater (Fluarix/Fluzone/Flulaval)    Zoster vaccine, recombinant, adult (SHINGRIX)    Breast cancer screening by mammogram     Z12.31    Relevant Orders    BI mammo bilateral screening tomosynthesis    Encounter for immunization     Z23    Relevant Orders    Flu vaccine, trivalent, preservative free, age 6 months and greater (Fluarix/Fluzone/Flulaval)    Zoster vaccine, recombinant, adult (SHINGRIX)    Breast cancer screening other than mammogram     Z12.39    Relevant Orders    Blood Urea Nitrogen    Creatinine    MR breast bilateral w contrast full protocol    Family history of breast cancer     Z80.3    Relevant Orders    Blood Urea Nitrogen    Creatinine    MR breast bilateral w contrast full protocol            Additional Visit Plans:  - Complete history and physical examination was performed      GENERAL RECOMMENDATIONS:  - I encourage you to eat a low-fat, moderate-carbohydrate, low-calorie diet to maintain a normal BMI (under 25) to reduce heart disease, and risk for diabetes  - Moderate intensity exercise for 30 minutes 5 days per week is recommended  - Helpful resources recommended by the American Academy of Family Practice can be found at www.familydoctor.org or www.choosemyplate.gov  - Can also consider enrolling in a program such as Weight [employer] or [facility] [employer]. The most effective diet is going to be one you can follow long term and turn into a lifestyle. The CDC recommends losing 0.5-2 lbs a week if overweight or obese.  - I recommend a routine vision check and dental cleanings every 6 months      BLOOD TESTING:  -Labs recently done, reviewed today  - Blood work may include a cholesterol and diabetes screen if risk factors exist (overweight, high blood pressure etc); screening for sexually transmitted infections; and Hepatitis C Virus screening      VACCINATION RECOMMENDATIONS:  - Flu shot annually.   Due at this time, received  - Tetanus booster every 10 years.  Up-to-date, next due 2030  - Two pneumonia vaccinations starting at 65 years old (or earlier if risk factors - smoker, diabetic, heart or lung conditions) - not due yet.  - Shingles vaccine for those 50 years or older -due at this time, received. Come back in 2-6 months for the second dose.       SCREENING RECOMMENDATIONS:  - Cervical cancer screening (pap test) in women starting at age 21 until age 65 years old.  Up-to-date  - Mammogram screening for breast cancer in women starting at 40-50 years and every 1-2 years -due at this time  - Bone density screening (DEXA) for osteoporosis in women aged 65 years and older (in younger women who are higher risk) - not due yet.  - Colon cancer screening (with colonoscopy or Cologuard) for men and women starting at age 45 until 74 years old (or earlier if family history of colon cancer) -due 2025    - You meet guidelines for hereditery cancer genetic testing based on your personal and family history, which we carry out through [facility]. They currently have a genetic cancer panel that screens for 36 cancer genes related to a variety of cancers that includes breast, ovarian and colon cancers, along with other types of cancers  - We will provide you with a lab order and testing kit to take to the lab to have this done  - [facility] will contact you if the cost of your test through your insurance is higher than the $249 out of pocket amount, and can adjust the cost to the out of pocket amount if needed  - If you test positive for one of these genetic mutations, [facility] will test any one of your close family members for free within 90 days of your results  - Our office will be happy to help you understand your results once they are received. [facility] also has an extensive network of genetic counselors who will be available to you as well through the program  - Results normally take a few weeks to  come back.  - Order placed to have test done      Plan for breast MRI in 6 months.       Next Wellness Exam/Annual Physical Due  In 1 year from today    Patient Care Team:  Get Win DO as PCP - General (Family Medicine)    Get Win DO   10/25/24   8:55 AM

## 2024-10-25 NOTE — PATIENT INSTRUCTIONS
Problem List Items Addressed This Visit             ICD-10-CM    Family history of breast cancer in first degree relative Z80.3    Relevant Orders    BI mammo bilateral screening tomosynthesis    [facility]    Blood Urea Nitrogen    Creatinine    MR breast bilateral w contrast full protocol    At high risk for breast cancer Z91.89    Relevant Orders    BI mammo bilateral screening tomosynthesis    [facility]    Blood Urea Nitrogen    Creatinine    MR breast bilateral w contrast full protocol     Other Visit Diagnoses         Codes    Routine health maintenance    -  Primary Z00.00    Relevant Orders    Flu vaccine, trivalent, preservative free, age 6 months and greater (Fluarix/Fluzone/Flulaval)    Zoster vaccine, recombinant, adult (SHINGRIX)    Breast cancer screening by mammogram     Z12.31    Relevant Orders    BI mammo bilateral screening tomosynthesis    Encounter for immunization     Z23    Relevant Orders    Flu vaccine, trivalent, preservative free, age 6 months and greater (Fluarix/Fluzone/Flulaval)    Zoster vaccine, recombinant, adult (SHINGRIX)    Breast cancer screening other than mammogram     Z12.39    Relevant Orders    Blood Urea Nitrogen    Creatinine    MR breast bilateral w contrast full protocol    Family history of breast cancer     Z80.3    Relevant Orders    Blood Urea Nitrogen    Creatinine    MR breast bilateral w contrast full protocol            Additional Visit Plans:  - Complete history and physical examination was performed      GENERAL RECOMMENDATIONS:  - I encourage you to eat a low-fat, moderate-carbohydrate, low-calorie diet to maintain a normal BMI (under 25) to reduce heart disease, and risk for diabetes  - Moderate intensity exercise for 30 minutes 5 days per week is recommended  - Helpful resources recommended by the American Academy of Family Practice can be found at www.familydoctor.org or www.choosemyplate.gov  - Can also consider enrolling in a program such as [employer] or [employer]. The most effective diet is going to be one you can follow long term and turn into a lifestyle. The CDC recommends losing 0.5-2 lbs a week if overweight or obese.  - I recommend a routine vision check and dental cleanings every 6 months      BLOOD TESTING:  -Labs recently done, reviewed today  - Blood work may include a cholesterol and diabetes screen if risk factors exist (overweight, high blood pressure etc); screening for sexually transmitted infections; and Hepatitis C Virus screening      VACCINATION RECOMMENDATIONS:  - Flu shot annually.  Due at this time, received  - Tetanus booster every 10 years.  Up-to-date, next due 2030  - Two pneumonia vaccinations starting at 65 years old (or earlier if risk factors - smoker, diabetic, heart or lung conditions) - not due yet.  - Shingles vaccine for those 50 years or older -due at this time, received. Come back in 2-6 months for the second dose.       SCREENING RECOMMENDATIONS:  - Cervical cancer screening (pap test) in women starting at age 21 until age 65 years old.  Up-to-date  - Mammogram screening for breast cancer in women starting at 40-50 years and every 1-2 years -due at this time  - Bone density screening (DEXA) for osteoporosis in women aged 65 years and older (in younger women who are higher risk) - not due yet.  - Colon cancer screening (with colonoscopy or Cologuard) for men and women starting at age 45 until 74 years old (or earlier if family history of colon cancer) -due 2025    - You meet guidelines for hereditery cancer genetic testing based on your personal and family history, which we carry out through [facility]. They currently have a genetic cancer panel that screens for 36 cancer genes related to a variety of cancers that includes breast, ovarian and colon cancers, along with other types of cancers  - We will provide you with a lab order and testing kit to take to the lab to have this done  - [facility] will  contact you if the cost of your test through your insurance is higher than the $249 out of pocket amount, and can adjust the cost to the out of pocket amount if needed  - If you test positive for one of these genetic mutations, [facility] will test any one of your close family members for free within 90 days of your results  - Our office will be happy to help you understand your results once they are received. [facility] also has an extensive network of genetic counselors who will be available to you as well through the program  - Results normally take a few weeks to come back.  - Order placed to have test done      Plan for breast MRI in 6 months.       Next Wellness Exam/Annual Physical Due  In 1 year from today    Patient Care Team:  Get Win DO as PCP - General (Family Medicine)    Get Win DO   10/25/24   8:55 AM

## 2024-11-11 ENCOUNTER — HOSPITAL ENCOUNTER (OUTPATIENT)
Dept: RADIOLOGY | Facility: CLINIC | Age: 59
Discharge: HOME | End: 2024-11-11
Payer: COMMERCIAL

## 2024-11-11 VITALS — WEIGHT: 175 LBS | HEIGHT: 69 IN | BODY MASS INDEX: 25.92 KG/M2

## 2024-11-11 DIAGNOSIS — Z12.31 SCREENING MAMMOGRAM FOR BREAST CANCER: ICD-10-CM

## 2024-11-11 PROCEDURE — 77067 SCR MAMMO BI INCL CAD: CPT

## 2024-11-11 PROCEDURE — 77063 BREAST TOMOSYNTHESIS BI: CPT | Performed by: RADIOLOGY

## 2024-11-11 PROCEDURE — 77067 SCR MAMMO BI INCL CAD: CPT | Performed by: RADIOLOGY

## 2024-12-14 ENCOUNTER — LAB (OUTPATIENT)
Dept: LAB | Facility: LAB | Age: 59
End: 2024-12-14
Payer: COMMERCIAL

## 2024-12-14 DIAGNOSIS — Z91.89 AT HIGH RISK FOR BREAST CANCER: ICD-10-CM

## 2024-12-14 DIAGNOSIS — Z80.3 FAMILY HISTORY OF BREAST CANCER IN FIRST DEGREE RELATIVE: ICD-10-CM

## 2025-01-04 LAB — COMMENTS - MP RESULT TYPE: NORMAL

## 2025-01-16 NOTE — PROGRESS NOTES
Please refax [facility] paperwork to [phone]. Printed for you already.

## 2025-02-13 ENCOUNTER — TELEPHONE (OUTPATIENT)
Dept: PRIMARY CARE | Facility: CLINIC | Age: 60
End: 2025-02-13
Payer: COMMERCIAL

## 2025-04-17 PROBLEM — D58.2 ELEVATED HEMOGLOBIN: Status: RESOLVED | Noted: 2023-10-23 | Resolved: 2025-04-17

## 2025-04-17 PROBLEM — R03.0 ELEVATED BLOOD PRESSURE READING: Status: RESOLVED | Noted: 2023-10-23 | Resolved: 2025-04-17

## 2025-04-17 NOTE — PROGRESS NOTES
HPI:  pt here to establish; transfer from [facility]     Hypertension:          The patient has no issues;home readings 130/80's.         The patients cardiovascular risk factors include:         Cardiac Risk Factors  Age > 45-male, > 55-female:  YES  +1   Smoking:   NO   Sig. family hx of CHD*:  NO   Hypertension:   YES  +1   Diabetes:   NO   HDL < 35:   NO   HDL > 59:   NO   Total: 2     *- Sig. family h/o CHD per NCEP = MI or sudden death at <54yo in   father or other 1st-degree male relative, or <64yo in mother or   other 1st-degree female relative           The patients adherence to the treatment regimen is Excellent         Responses to the medications has been Excellent        MEDICAL HISTORY:   Past Medical History:   Diagnosis Date    Anxiety 2019 mild    At high risk for breast cancer 10/25/2024    Dense breast tissue on mammogram 10/23/2023    Family history of breast cancer in first degree relative 10/25/2024    Gilbert syndrome 10/23/2023    Hypertension     Indirect hyperbilirubinemia 10/23/2023    Varicella Age8-8     MEDICATIONS:   Current Outpatient Medications   Medication Sig Dispense Refill    calcium carbonate (Calcium 600) 600 mg calcium (1,500 mg) tablet Take 600 mg by mouth once daily.      cyanocobalamin (Vitamin B-12) 100 mcg tablet as directed Orally      hydrOXYzine HCL (Atarax) 25 mg tablet Take 1-2 tablets (25-50 mg) by mouth every 8 hours. 90 tablet 0    propranolol (Inderal) 20 mg tablet Take 1.5 tablets (30 mg) by mouth once daily. 135 tablet 1     No current facility-administered medications for this visit.     ALLERGIES: No Known Allergies  FAMILY HISTORY:   Family History   Problem Relation Name Age of Onset    Breast cancer Mother Jelly     Arthritis Mother Jelly     Cancer Mother Jelly     Diabetes Father Luis Miguel     Breast cancer Sister Sushila     Breast cancer Sister      No Known Problems Brother      Colon cancer Maternal Grandmother Melinda     Parkinsonism Maternal  "Grandmother Melinda     Heart failure Maternal Grandmother Melinda     Cancer Maternal Grandmother Melinda      SOCIAL HISTORY:   Social History     Tobacco Use    Smoking status: Never     Passive exposure: Never    Smokeless tobacco: Never   Vaping Use    Vaping status: Never Used   Substance Use Topics    Alcohol use: Yes     Alcohol/week: 6.0 standard drinks of alcohol     Types: 6 Standard drinks or equivalent per week    Drug use: Never         ROS:      CONSTITUTION:  alert and oriented     OPHTHALMOLOGY:          no Change in vision.         CARDIOLOGY:          no Chest pain.  no Dyspnea on exertion.  no Shortness of breath.         ENDOCRINOLOGY:          no Excessive thirst.  no Excessive urination.  no Fatigue.  no Skin changes.  no Weight gain.  no Weight loss.         SKIN:          no Healing problems.         NEUROLOGY:          no Loss of sensation in specific body area.  no Burning pain in feet.  no Peripheral neuropathy.  no Tingling/numbness.    LABS:   Lab Results   Component Value Date    GLUCOSE 90 10/16/2024    CALCIUM 9.4 10/16/2024     10/16/2024    K 4.2 10/16/2024    CO2 30 10/16/2024     10/16/2024    BUN 16 10/16/2024    CREATININE 0.89 10/16/2024     Lab Results   Component Value Date    CHOL 194 10/16/2024    CHOL 211 (H) 11/08/2023    CHOL 198 08/18/2022     Lab Results   Component Value Date    HDL 53.3 10/16/2024    HDL 58.5 11/08/2023    HDL 57 08/18/2022     Lab Results   Component Value Date    LDLCALC 111 (H) 10/16/2024    LDLCALC 125 (H) 11/08/2023    LDLCALC 115 08/18/2022     Lab Results   Component Value Date    TRIG 148 10/16/2024    TRIG 138 11/08/2023    TRIG 129 08/18/2022     No components found for: \"CHOLHDL\"       VITAL SIGNS:  /70   Pulse 78   Ht 1.753 m (5' 9\")   Wt 80.5 kg (177 lb 6.4 oz)   LMP  (LMP Unknown)   SpO2 99%   BMI 26.20 kg/m²     General Appearance: awake, alert, oriented, in no acute distress  Lungs: Normal expansion.  Clear to " auscultation.  No rales, rhonchi, or wheezing.  Heart: Heart sounds are normal.  Regular rate and rhythm without murmur, gallop or rub.  Extremities: Extremities warm to touch, pink, with no edema.  Neurologic: Alert and oriented x 3, gait normal., reflexes normal and symmetric, strength and  sensation grossly normal       Trish was seen today for establish care and hypertension.  Diagnoses and all orders for this visit:  Essential (primary) hypertension (Primary)  -     Albumin-Creatinine Ratio, Urine Random; Future  -     Urinalysis with Reflex Microscopic; Future  -     Discontinue: propranolol (Inderal) 20 mg tablet; Take 1.5 tablets (30 mg) by mouth once daily.  -     propranolol (Inderal) 20 mg tablet; Take 1.5 tablets (30 mg) by mouth once daily.  -     Albumin-Creatinine Ratio, Urine Random  -     Urinalysis with Reflex Microscopic  -     Follow Up In Primary Care - Established; Future

## 2025-05-05 ENCOUNTER — OFFICE VISIT (OUTPATIENT)
Dept: PRIMARY CARE | Facility: CLINIC | Age: 60
End: 2025-05-05
Payer: COMMERCIAL

## 2025-05-05 VITALS
HEIGHT: 69 IN | OXYGEN SATURATION: 99 % | DIASTOLIC BLOOD PRESSURE: 70 MMHG | SYSTOLIC BLOOD PRESSURE: 128 MMHG | BODY MASS INDEX: 26.28 KG/M2 | HEART RATE: 78 BPM | WEIGHT: 177.4 LBS

## 2025-05-05 DIAGNOSIS — I10 ESSENTIAL (PRIMARY) HYPERTENSION: Primary | ICD-10-CM

## 2025-05-05 PROCEDURE — 1036F TOBACCO NON-USER: CPT | Performed by: FAMILY MEDICINE

## 2025-05-05 PROCEDURE — 3008F BODY MASS INDEX DOCD: CPT | Performed by: FAMILY MEDICINE

## 2025-05-05 PROCEDURE — 3074F SYST BP LT 130 MM HG: CPT | Performed by: FAMILY MEDICINE

## 2025-05-05 PROCEDURE — 3078F DIAST BP <80 MM HG: CPT | Performed by: FAMILY MEDICINE

## 2025-05-05 PROCEDURE — 99213 OFFICE O/P EST LOW 20 MIN: CPT | Performed by: FAMILY MEDICINE

## 2025-05-05 RX ORDER — PROPRANOLOL HYDROCHLORIDE 20 MG/1
30 TABLET ORAL DAILY
Qty: 135 TABLET | Refills: 1 | Status: SHIPPED | OUTPATIENT
Start: 2025-05-05 | End: 2025-11-01

## 2025-05-05 RX ORDER — PSYLLIUM HUSK 0.4 G
600 CAPSULE ORAL DAILY
COMMUNITY

## 2025-05-05 RX ORDER — PROPRANOLOL HYDROCHLORIDE 20 MG/1
30 TABLET ORAL DAILY
Qty: 1 TABLET | Refills: 0 | Status: SHIPPED | OUTPATIENT
Start: 2025-05-05 | End: 2025-05-05 | Stop reason: SDUPTHER

## 2025-05-05 ASSESSMENT — PATIENT HEALTH QUESTIONNAIRE - PHQ9
2. FEELING DOWN, DEPRESSED OR HOPELESS: NOT AT ALL
1. LITTLE INTEREST OR PLEASURE IN DOING THINGS: NOT AT ALL
SUM OF ALL RESPONSES TO PHQ9 QUESTIONS 1 AND 2: 0

## 2025-05-05 ASSESSMENT — PAIN SCALES - GENERAL: PAINLEVEL_OUTOF10: 0-NO PAIN
